# Patient Record
Sex: FEMALE | Race: WHITE | URBAN - METROPOLITAN AREA
[De-identification: names, ages, dates, MRNs, and addresses within clinical notes are randomized per-mention and may not be internally consistent; named-entity substitution may affect disease eponyms.]

---

## 2024-09-25 ENCOUNTER — OFFICE VISIT (OUTPATIENT)
Dept: NEUROLOGY | Facility: HOSPITAL | Age: 24
End: 2024-09-25
Payer: COMMERCIAL

## 2024-09-25 VITALS
DIASTOLIC BLOOD PRESSURE: 92 MMHG | SYSTOLIC BLOOD PRESSURE: 140 MMHG | TEMPERATURE: 97.7 F | RESPIRATION RATE: 18 BRPM | WEIGHT: 165 LBS | HEIGHT: 69 IN | BODY MASS INDEX: 24.44 KG/M2 | HEART RATE: 114 BPM

## 2024-09-25 DIAGNOSIS — G43.109 MIGRAINE WITH AURA AND WITHOUT STATUS MIGRAINOSUS, NOT INTRACTABLE: Primary | ICD-10-CM

## 2024-09-25 PROCEDURE — 3008F BODY MASS INDEX DOCD: CPT | Performed by: PSYCHIATRY & NEUROLOGY

## 2024-09-25 PROCEDURE — 99215 OFFICE O/P EST HI 40 MIN: CPT | Mod: GC | Performed by: PSYCHIATRY & NEUROLOGY

## 2024-09-25 PROCEDURE — 99205 OFFICE O/P NEW HI 60 MIN: CPT | Performed by: PSYCHIATRY & NEUROLOGY

## 2024-09-25 RX ORDER — AMITRIPTYLINE HYDROCHLORIDE 25 MG/1
25 TABLET, FILM COATED ORAL NIGHTLY
COMMUNITY
Start: 2024-07-19 | End: 2024-09-25 | Stop reason: ALTCHOICE

## 2024-09-25 RX ORDER — RIMEGEPANT SULFATE 75 MG/75MG
1 TABLET, ORALLY DISINTEGRATING ORAL
COMMUNITY
Start: 2024-09-03 | End: 2024-09-25 | Stop reason: ALTCHOICE

## 2024-09-25 RX ORDER — PROPRANOLOL HYDROCHLORIDE 60 MG/1
60 CAPSULE, EXTENDED RELEASE ORAL DAILY
Qty: 30 CAPSULE | Refills: 11 | Status: SHIPPED | OUTPATIENT
Start: 2024-09-25 | End: 2025-09-25

## 2024-09-25 RX ORDER — SUMATRIPTAN 50 MG/1
50 TABLET, FILM COATED ORAL ONCE AS NEEDED
Qty: 9 TABLET | Refills: 5 | Status: SHIPPED | OUTPATIENT
Start: 2024-09-25

## 2024-09-25 ASSESSMENT — PAIN SCALES - GENERAL: PAINLEVEL: 0-NO PAIN

## 2024-09-25 NOTE — PROGRESS NOTES
Subjective   Wallerdavid Wolfe is a 24 y.o. right-handed female PMH migraines presenting with worsening migraines.    HPI  Chief concern: Worsening Migraines    Migraines worsening in frequency and severity starting about one year ago. Frequency of migraines is now once per week. Patient endorses same visual auras and quality of headache but the severity has increased (9/10 in pain). Endorses photophobia and phonophobia. Denies n/v. Denies positional relationship. Migraines last will last a couple hours or sometimes goes into the next day. Primary doctor prescribed Nurtec which she endorses helps at the onset (helps with severity). Patient takes Amytriptyline daily which has not helped much. Patient is taking Tylenol and Advil around three times per week (once per day).     Patient is endorsing ringing in her ears which is happening all the time and worsened with migraines. Tinnitus started a year ago. Patient agreed with description of a blood rush sound. Denies worsening with position. Patient endorses hearing is normal. Denies vertigo, room spinning. Will feel lightheaded when standing up for five seconds, which improves.    Starting six months ago patient was experiencing numbness and tingling from both ankles to below the knee. Denies pain, weakness. Patient endorses it happens randomly, cannot recall a causal relationship. Numbness will happen at least once per day. Numbness will last for an hour to days at a time. Denies any trauma. Patient is active, goes to the gym every day and walks a few miles per day.     Around two months ago patient describes pulsing in the right shoulder. Occurs 2- 3 times per day.Denies triggers. Will last 5-30 minutes. Denies numbness/tingling/weakness.     Baseline migraines: Started having headaches and migraines four-five years ago. Patient would sometimes have blurry vision or see white lines in her periphery prior to the migraine lasting a few minutes. Pain would occur in the  back of her head (can happen bilaterally but more common on the right). Describes them as throbbing and 6-7/10 in terms of pain. Pain did not radiate. Endorses photophobia and phonophobia. Would sometimes have n/v but not frequently. Denies eye tearing. Denies positional relationship. Events would last a few hours to overnight. Patient would take Tylenol and Advil, drink water and it would eventually go away. Patient took Tylenol and Advil daily for maybe three to four days in a row (twice per day) until migraine went away. Frequency was once every 3-4 weeks. Has never used Triptans.    Patient endorses migraines will prevent her from falling asleep but always falls asleep eventually. Migraines do not wake her up. Denies prior history of epilepsy, strokes, vascular malformations.    Family History  Denies family history of migraines or any neurologic diseases.    PMH  None, no asthma or COPD    SH  No smoking, recreational drugs  Drinks once every 2-3 weeks  Works full time as a   Denies other stressors    Objective   Visit Vitals  BP (!) 140/92   Pulse (!) 114   Temp 36.5 °C (97.7 °F)   Resp 18       Neurological Exam  Physical Exam  Physical Exam:  General: No acute distress  Skin: Warm and dry, some tattoos  Heart: RRR  Lungs: No increased work of breathing  Extremities: ROM intact    Neurological Exam:  MENTAL STATUS:  General appearance: No acute distress  Orientation: A&Ox3  Language: Expression, repetition, naming, comprehension intact  Follows complex commands across midline    CRANIAL NERVES:  - Fundoscopic exam: Optic disc margins sharp bilaterally, although limited by examiner expertise  - II/III: PERRL  - II:  Visual fields intact to confrontation bilaterally tested individually and together  - III, IV, VI: EOM full to pursuit without nystagmus  - V: V1-V3 sensation intact bilaterally  - VII: Face muscles symmetric with smile and eye closure  - VIII: Intact to interview  - IX, X: Palate elevated  symmetrically bilaterally, no hoarseness  - XI: 5/5 strength on shoulder shrugging bilaterally  - XII: Tongue midline without atrophy or fasciculation    MOTOR: Tone and bulk normal in all extremities. No tremor, no abnormal movements.    STRENGTH: R L  Deltoid  5 5  Biceps  5 5  Triceps  5 5    5 5  Finger Abd 5 5  Hip flexion 5 5  Quadriceps 5 5  Hamstrings 5 5  DorsiFlex 5 5  PlantarFlex 5 5    REFLEXES: R L  Biceps  +1 +1  Triceps  +1 +1  Brachioradialis +1 +1  Patellar  +1 +1  Achilles +1 +1      COORDINATION: Intact on finger to nose bl, intact on heel to shin bl, EZIO intact bl  SENSORY: Intact to light touch in bl UE and LE, patient not experiencing numbness in legs at time of exam  PROPRIOCEPTION:  Deferred  ROMBERG: Negative  GAIT: Normal standard gait, able to toe, heel, and tandem walk       CT Head wo IV contrast 4/6/22  No evidence of intracranial hemorrhage or acute pathology.       Assessment/Plan   Ruben Wolfe is a 24 y.o. right-handed female PMH migraines presenting with worsening migraines for the past year. Changes in medications by PCP has not provided much relief. Although the quality of the migraines remain the same as her prior migraines, the frequency and severity has worsened. In addition, patient is presenting with new unusual symptoms such as tinnitus and numbness in her legs. Due to this, we have decided to pursue imaging to rule out any structural or demyelinating lesions. We have also agreed on a new migraine regimen. Propranolol will work well in this patient, especially since she presented hypertensive and tachycardic. Patient was also interested in a new abortive medication since the Nurtec was not working as well.     -MRI Brain w and wo IV contrast  -Propranolol 60mg daily for preventative therapy  -Sumatriptan 50mg prn for abortive therapy  -Discontinue Amitriptyline  -Discontinue Nurtec  -Follow up in 3 months    Renetta Boyle MD  Department of Neurology, PGY-2    Patient  was seen and plan was discussed with attending Dr Nuñez.

## 2024-09-25 NOTE — PATIENT INSTRUCTIONS
"Hello! Today you were seen in the Neurology clinic with worsening migraines. We have come up with the following plan:    -Propranolol 60mg daily for migraine prevention  -Sumatriptan as needed for migraine   -Please obtain an MRI Brain w and wo IV contrast to rule out any structural changes    To prevent medication overuse headache:  · Take your headache medication as prescribed.  · Avoid medications that contain butalbital or opioids.  · Use OTC painkillers less than 15 days a month.  · Limit use of triptans or combination analgesics to no more than nine days a month.    General guidelines that can help prevent most headaches:  · Avoid and recognize headache triggers: common triggers include fasting, certain foods and alcohols such as red wine, sleep deprivation, menstrual cycle, and stress  Some choose to take a \"headache diary\" over the course of one month. In a diary, you keep track of your severe headaches, and record information about how long each lasts, the location and intensity, what you did and ate before it came on, and whether it responded to treatment. This can be used to determine what triggers the migraines and what makes them better  · Stay hydrated. Be sure to drink plenty of water or other uncaffeinated fluids.  · Keep a regular sleep schedule and sleep at least 8-9 hours a night  · Exercise regularly and reduce stress.   · Obesity can contribute to headache development, so if you need to lose weight, find a program that works for you.  · If you smoke, talk to your doctor about quitting. Smoking is linked to a higher risk of medication overuse headache.    "

## 2024-11-05 ENCOUNTER — APPOINTMENT (OUTPATIENT)
Dept: NEUROLOGY | Facility: CLINIC | Age: 24
End: 2024-11-05
Payer: COMMERCIAL

## 2024-11-07 ENCOUNTER — APPOINTMENT (OUTPATIENT)
Dept: RADIOLOGY | Facility: CLINIC | Age: 24
End: 2024-11-07
Payer: COMMERCIAL

## 2025-02-05 ENCOUNTER — APPOINTMENT (OUTPATIENT)
Dept: NEUROLOGY | Facility: HOSPITAL | Age: 25
End: 2025-02-05
Payer: COMMERCIAL

## 2025-06-04 ENCOUNTER — OFFICE VISIT (OUTPATIENT)
Dept: NEUROLOGY | Facility: HOSPITAL | Age: 25
End: 2025-06-04
Payer: COMMERCIAL

## 2025-06-04 VITALS
RESPIRATION RATE: 18 BRPM | HEART RATE: 98 BPM | TEMPERATURE: 97.1 F | WEIGHT: 160 LBS | BODY MASS INDEX: 23.7 KG/M2 | HEIGHT: 69 IN | SYSTOLIC BLOOD PRESSURE: 123 MMHG | DIASTOLIC BLOOD PRESSURE: 86 MMHG

## 2025-06-04 DIAGNOSIS — G43.409 HEMIPLEGIC MIGRAINE WITHOUT STATUS MIGRAINOSUS, NOT INTRACTABLE: Primary | ICD-10-CM

## 2025-06-04 PROCEDURE — 3008F BODY MASS INDEX DOCD: CPT

## 2025-06-04 PROCEDURE — 99214 OFFICE O/P EST MOD 30 MIN: CPT | Mod: GC

## 2025-06-04 PROCEDURE — 1036F TOBACCO NON-USER: CPT

## 2025-06-04 PROCEDURE — 99214 OFFICE O/P EST MOD 30 MIN: CPT

## 2025-06-04 ASSESSMENT — PAIN SCALES - GENERAL: PAINLEVEL_OUTOF10: 0-NO PAIN

## 2025-06-04 NOTE — PATIENT INSTRUCTIONS
"Hello! You were seen in the Neurology clinic. 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 Due to this, you can no longer take any triptans or a medication class called ergot alkaloids. We will update your medical chart with this information. Please avoid smoking and hormonal therapy.    Also take Magnesium Oxide 400mg as needed for migraines.    To prevent medication overuse headache:  · Take your headache medication as prescribed.  · Avoid medications that contain butalbital or opioids.  · Use OTC painkillers less than 15 days a month.  · Limit use of triptans or combination analgesics to no more than nine days a month.    General guidelines that can help prevent most headaches:  · Avoid and recognize headache triggers: common triggers include fasting, certain foods and alcohols such as red wine, sleep deprivation, menstrual cycle, and stress  Some choose to take a \"headache diary\" over the course of one month. In a diary, you keep track of your severe headaches, and record information about how long each lasts, the location and intensity, what you did and ate before it came on, and whether it responded to treatment. This can be used to determine what triggers the migraines and what makes them better  · Stay hydrated. Be sure to drink plenty of water or other uncaffeinated fluids.  · Keep a regular sleep schedule and sleep at least 8-9 hours a night  · Exercise regularly and reduce stress.   · Obesity can contribute to headache development, so if you need to lose weight, find a program that works for you.  · If you smoke, talk to your doctor about quitting. Smoking is linked to a higher risk of medication overuse headache.  "

## 2025-06-04 NOTE — PROGRESS NOTES
Subjective   Ruben Wolfe is a 25 y.o. right-handed female PMH migraines presenting with worsening migraines.    HPI  Chief concern: Migraines    Interval History 6/4/25:  MRI Brain wo IV contrast obtained in October, reviewed at the time which did not show any irregularities.     Patient reports she is doing better than the first time she was seen in this clinic. She believes the propranolol is helpful. She does not believe the sumatriptan is helping much.    Patient reports an odd episode at the beginning of May. Patient was sitting on her couch. She got up and walked up the stairs. When trying to go up the first step, her right side became flaccid. She lost vision in both eyes for 20-30 seconds. She fell down to the floor. She believes she was on the ground for 2.5 minutes. She felt normal before it started. Patient was by herself and did not trying speaking during this. She was able to speak after. After getting up she continued to feel weak but this improved as time went on. She reports a mild headache after. She believes it was somewhat similar to her migraines but different. She felt very out of it. She denies urinary incontinence, tongue biting. She felt short of breath during and after. This has never happened before.    Patient reports migraines twice per month now. She rates the severity 9/10 and it hinders her day. Denies side effects from the propranolol. Patient will take sumatriptan at the beginning of a migraine and it still does not help. Patient still reports tinnitus. Patient feels as if her BLE numbness has improved. Patient is taking Amitriptyline as well.    =============================================    Migraines worsening in frequency and severity starting about one year ago. Frequency of migraines is now once per week. Patient endorses same visual auras and quality of headache but the severity has increased (9/10 in pain). Endorses photophobia and phonophobia. Denies n/v. Denies  positional relationship. Migraines last will last a couple hours or sometimes goes into the next day. Primary doctor prescribed Nurtec which she endorses helps at the onset (helps with severity). Patient takes Amytriptyline daily which has not helped much. Patient is taking Tylenol and Advil around three times per week (once per day).     Patient is endorsing ringing in her ears which is happening all the time and worsened with migraines. Tinnitus started a year ago. Patient agreed with description of a blood rush sound. Denies worsening with position. Patient endorses hearing is normal. Denies vertigo, room spinning. Will feel lightheaded when standing up for five seconds, which improves.    Starting six months ago patient was experiencing numbness and tingling from both ankles to below the knee. Denies pain, weakness. Patient endorses it happens randomly, cannot recall a causal relationship. Numbness will happen at least once per day. Numbness will last for an hour to days at a time. Denies any trauma. Patient is active, goes to the gym every day and walks a few miles per day.     Around two months ago patient describes pulsing in the right shoulder. Occurs 2- 3 times per day.Denies triggers. Will last 5-30 minutes. Denies numbness/tingling/weakness.     Baseline migraines: Started having headaches and migraines four-five years ago. Patient would sometimes have blurry vision or see white lines in her periphery prior to the migraine lasting a few minutes. Pain would occur in the back of her head (can happen bilaterally but more common on the right). Describes them as throbbing and 6-7/10 in terms of pain. Pain did not radiate. Endorses photophobia and phonophobia. Would sometimes have n/v but not frequently. Denies eye tearing. Denies positional relationship. Events would last a few hours to overnight. Patient would take Tylenol and Advil, drink water and it would eventually go away. Patient took Tylenol and Advil  daily for maybe three to four days in a row (twice per day) until migraine went away. Frequency was once every 3-4 weeks. Has never used Triptans.    Patient endorses migraines will prevent her from falling asleep but always falls asleep eventually. Migraines do not wake her up. Denies prior history of epilepsy, strokes, vascular malformations.    Family History  Denies family history of migraines. Reports mother has Epilepsy (epilepsy started after dementia).     PMH  None, no asthma or COPD    SH  No smoking, recreational drugs  Drinks once every 2-3 weeks  Works full time as a   Denies other stressors    Objective   Visit Vitals  /86   Pulse 98   Temp 36.2 °C (97.1 °F)   Resp 18         Neurological Exam  Physical Exam  Physical Exam:  General: No acute distress  Skin: Warm and dry, some tattoos  Heart: RRR  Lungs: No increased work of breathing  Extremities: ROM intact    Neurological Exam:  MENTAL STATUS:  General appearance: No acute distress  Orientation: A&Ox3  Language: Expression, repetition, naming, comprehension intact  Follows complex commands across midline    CRANIAL NERVES:  - Fundoscopic exam: Optic disc margins sharp bilaterally, although limited by examiner expertise  - II/III: PERRL  - II:  Visual fields intact to confrontation bilaterally tested individually and together  - III, IV, VI: EOM full to pursuit without nystagmus  - V: V1-V3 sensation intact bilaterally  - VII: Face muscles symmetric with smile and eye closure  - VIII: Intact to interview  - IX, X: Palate elevated symmetrically bilaterally, no hoarseness  - XI: 5/5 strength on shoulder shrugging bilaterally  - XII: Tongue midline without atrophy or fasciculation    MOTOR: Tone and bulk normal in all extremities. No tremor, no abnormal movements.    STRENGTH: R L  Deltoid  5 5  Biceps  5 5  Triceps  5 5    5 5  Finger Abd 5 5  Hip  flexion 5 5  Quadriceps 5 5  Hamstrings 5 5  DorsiFlex 5 5  PlantarFlex 5 5    REFLEXES: R L  Biceps  +1 +1  Triceps  +1 +1  Brachioradialis +1 +1  Patellar  +1 +1  Achilles +1 +1      COORDINATION: Intact on finger to nose bl, intact on heel to shin bl, EZIO intact bl  SENSORY: Intact to light touch in bl UE and LE, patient not experiencing numbness in legs at time of exam  PROPRIOCEPTION:  Deferred  ROMBERG: Negative  GAIT: Normal standard gait, able to toe, heel, and tandem walk       CT Head wo IV contrast 4/6/22  No evidence of intracranial hemorrhage or acute pathology.       Assessment/Plan   Ruben Wolfe is a 24 y.o. right-handed female PMH migraines presenting with worsening migraines for the past year. MRI brain did not show any abnormalities. Since last visit, migraine frequency has improved with propranolol and patient is tolerating the medication well. Patient is still taking Amitriptyline and was instructed to stop as it was not helping much previously.    Since the last visit, patient had an event involving right sided weakness followed by vision loss and headache. 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 There is lower suspicion for TIA in somebody of this age and without comorbidities. Lower suspicion for seizure based on description of event. Due to this, patient should avoid triptans and ergot alkaloids (marked as allergy in the chart). Will prescribe Nurtec for rescue therapy.    - Continue Propranolol 60mg daily for preventative therapy  - Nurtec for rescue therapy  - Encourage OTC Magnesium for migraines  - STOP sumatriptan, contraindication to other triptans and ergot alkaloids  - STOP amitriptyline  - Follow up in 3 months    Renetta Boyle MD  Department of Neurology, PGY-2    Patient was seen and plan was discussed with attending Dr Harper.

## 2025-07-08 ENCOUNTER — OFFICE VISIT (OUTPATIENT)
Dept: URGENT CARE | Age: 25
End: 2025-07-08
Payer: COMMERCIAL

## 2025-07-08 VITALS
SYSTOLIC BLOOD PRESSURE: 123 MMHG | TEMPERATURE: 98.4 F | DIASTOLIC BLOOD PRESSURE: 85 MMHG | HEART RATE: 73 BPM | OXYGEN SATURATION: 99 % | RESPIRATION RATE: 20 BRPM

## 2025-07-08 DIAGNOSIS — L55.9 BURN FROM THE SUN: ICD-10-CM

## 2025-07-08 DIAGNOSIS — L30.9 FACIAL DERMATITIS: Primary | ICD-10-CM

## 2025-07-08 PROCEDURE — 1036F TOBACCO NON-USER: CPT | Performed by: STUDENT IN AN ORGANIZED HEALTH CARE EDUCATION/TRAINING PROGRAM

## 2025-07-08 PROCEDURE — 99203 OFFICE O/P NEW LOW 30 MIN: CPT | Performed by: STUDENT IN AN ORGANIZED HEALTH CARE EDUCATION/TRAINING PROGRAM

## 2025-07-08 PROCEDURE — 99070 SPECIAL SUPPLIES PHYS/QHP: CPT | Performed by: STUDENT IN AN ORGANIZED HEALTH CARE EDUCATION/TRAINING PROGRAM

## 2025-07-08 RX ORDER — METHYLPREDNISOLONE 4 MG/1
TABLET ORAL
Qty: 21 TABLET | Refills: 0 | Status: SHIPPED | OUTPATIENT
Start: 2025-07-08 | End: 2025-07-14

## 2025-07-08 NOTE — PROGRESS NOTES
Subjective   Patient ID: Ruben Wolfe is a 25 y.o. female. They present today with a chief complaint of Rash and Facial Swelling.    History of Present Illness  Ruben is a pleasant 25-year-old female who presents to the urgent care for evaluation of several days of facial redness and swelling is been gradual in appearance and she suspects possible sun exposure and may be related to this.  Patient is concerned given these findings without significant improvement of symptoms despite supportive treatment measures.  Patient denies associated throat swelling, throat tightening, difficulty swallowing or lip tingling or mouth tingling or lip lesions or lip swelling.  Patient is seeking evaluation reassurance as is concern for possible sun poisoning.  Patient denies fever, vomiting or abdominal pain otherwise.  Patient states overall has not felt like self however denies other associated symptoms in the meantime.  No other treatments attempted.  Patient seeking evaluation reassurance.  No known allergy or outdoor exposures or insect bites reported.  No other symptoms or concerns otherwise reported.    Past Medical History  Allergies as of 07/08/2025 - Reviewed 06/04/2025   Allergen Reaction Noted    Ergot alkaloids Other 06/04/2025    [id number] antimigraine agents Other 06/04/2025       Prescriptions Prior to Admission[1]     Medical History[2]    Surgical History[3]     reports that she has never smoked. She has never used smokeless tobacco.    Review of Systems  A 10-point review of systems was performed, otherwise unremarkable unless stated in the history of present illness.                Objective    Vitals:    07/08/25 1156   BP: 123/85   Pulse: 73   Resp: 20   Temp: 36.9 °C (98.4 °F)   TempSrc: Skin   SpO2: 99%     No LMP recorded.    Gen: Vitals noted and reviewed, no evidence of acute distress, well developed and afebrile.   Psych: Mood and affect appropriate for setting.  Head/Face: Minimal inflammation  below the b/l eyes with nonindurated erythema of face without true periorbital edema or cellulitis appreciated and otherwise atraumatic and normocephalic.   Neuro: No focal deficits noted.  Mouth: No evidence of perioral erythema, lesions, edema or dermatitis.  ENT: TMs clear bilaterally, EACs unremarkable. Mastoids non-tender. Posterior oropharynx without erythema, exudate, or swelling. Uvula is in the midline and non-edematous.   Neck: Supple. No meningismus through full range of motion. No lymphadenopathy.   Cardiac: Regular rate and rhythm no murmur.   Lungs: Clear to auscultation throughout, No evidence of wheezing, rhonchi or crackles. Good aeration throughout.   Extremities: Symmetrical, No peripheral edema  Skin: diffuse facial redness and below the eyelid soft tissue edema as described above without lesions, vesicles, petechiae, excoriations, crusting or evidence of ecchymosis, wounds, or rashes.      Point of Care Test & Imaging Results from this visit  No results found for this visit on 07/08/25.   Imaging  No results found.    Cardiology, Vascular, and Other Imaging  No other imaging results found for the past 2 days      Diagnostic study results (if any) were reviewed by Abhijit Pathak DO.    Assessment/Plan   Allergies, medications, history, and pertinent labs/EKGs/Imaging reviewed by Abhijit Pathak DO.     Medical Decision Making  Discussed with the patient symptoms and clinical presentation findings suggestive of facial dermatitis which may be secondary to sun scalding/sunburn without obvious second-degree burns or lesions.  Minimal below the eye soft tissue swelling without true periorbital edema or erythema or rashes noted.  No lesions otherwise appreciated.  We discussed sun exposure and possible remnant sunburn in the setting of suspected unspecified facial dermatitis without high concern for true sun illness or heat exhaustion at this time.  We advised supportive treatment measures and close  symptom monitoring and use of over-the-counter remedies such as aloe vera and antihistamines for added symptom relief.  In the meantime given the patient's residual symptoms we did agree to prescribe a Medrol Dosepak in an attempt to relieve swelling symptoms.  We provided supportive treatment recommendations and referral to primary care to establish with. Follow up with Primary Care Provider. We advised seeking immediate emergency medical attention if symptoms fail to improve, worsen or any concerning symptoms arise. Patient voiced full understanding and agreement to plan.    We discussed with the patient our clinical thoughts at this time given the above findings and clinical assessment and we had a shared decision-making conversation in a patient-centered decision-making model on how to proceed forward. The patient was instructed on the importance of a close follow-up with Primary Care Provider and other care providers. The patient was also advised that an Urgent care diagnosis is often a preliminary impression and that definitive care is often not able to be given completley in the Urgent care setting.      Orders and Diagnoses  Diagnoses and all orders for this visit:  Facial dermatitis  -     methylPREDNISolone (Medrol Dospak) 4 mg tablets; Follow schedule on package instructions  -     Referral to Primary Care; Future  Burn from the sun  -     methylPREDNISolone (Medrol Dospak) 4 mg tablets; Follow schedule on package instructions  -     Referral to Primary Care; Future      Medical Admin Record      Patient disposition: Home    Electronically signed by Abhijit Pathak DO  12:18 PM           [1] (Not in a hospital admission)   [2] No past medical history on file.  [3] No past surgical history on file.

## 2025-07-09 ENCOUNTER — APPOINTMENT (OUTPATIENT)
Dept: NEUROLOGY | Facility: HOSPITAL | Age: 25
End: 2025-07-09
Payer: COMMERCIAL